# Patient Record
Sex: MALE | Race: OTHER | Employment: FULL TIME | URBAN - METROPOLITAN AREA
[De-identification: names, ages, dates, MRNs, and addresses within clinical notes are randomized per-mention and may not be internally consistent; named-entity substitution may affect disease eponyms.]

---

## 2024-10-09 ENCOUNTER — HOSPITAL ENCOUNTER (EMERGENCY)
Age: 59
Discharge: HOME | End: 2024-10-09
Attending: EMERGENCY MEDICINE
Payer: COMMERCIAL

## 2024-10-09 VITALS
HEIGHT: 69 IN | WEIGHT: 174.16 LBS | OXYGEN SATURATION: 98 % | SYSTOLIC BLOOD PRESSURE: 161 MMHG | TEMPERATURE: 98.3 F | BODY MASS INDEX: 25.8 KG/M2 | HEART RATE: 89 BPM | RESPIRATION RATE: 16 BRPM | DIASTOLIC BLOOD PRESSURE: 101 MMHG

## 2024-10-09 DIAGNOSIS — K04.7 DENTAL INFECTION: Primary | ICD-10-CM

## 2024-10-09 PROCEDURE — 99283 EMERGENCY DEPT VISIT LOW MDM: CPT

## 2024-10-09 RX ORDER — CLINDAMYCIN HCL 300 MG
300 CAPSULE ORAL 3 TIMES DAILY
Qty: 21 CAPSULE | Refills: 0 | Status: SHIPPED | OUTPATIENT
Start: 2024-10-09 | End: 2024-10-16

## 2024-10-09 RX ORDER — DICLOFENAC SODIUM 75 MG/1
75 TABLET, DELAYED RELEASE ORAL 2 TIMES DAILY
Qty: 30 TABLET | Refills: 0 | Status: SHIPPED | OUTPATIENT
Start: 2024-10-09

## 2024-10-09 RX ORDER — CLINDAMYCIN HCL 300 MG
300 CAPSULE ORAL 3 TIMES DAILY
Qty: 21 CAPSULE | Refills: 0 | Status: SHIPPED | OUTPATIENT
Start: 2024-10-09 | End: 2024-10-09

## 2024-10-09 RX ORDER — DICLOFENAC SODIUM 75 MG/1
75 TABLET, DELAYED RELEASE ORAL 2 TIMES DAILY
Qty: 30 TABLET | Refills: 0 | Status: SHIPPED | OUTPATIENT
Start: 2024-10-09 | End: 2024-10-09

## 2024-10-09 ASSESSMENT — PAIN - FUNCTIONAL ASSESSMENT: PAIN_FUNCTIONAL_ASSESSMENT: 0-10

## 2024-10-09 ASSESSMENT — PAIN DESCRIPTION - ORIENTATION: ORIENTATION: LEFT;LOWER

## 2024-10-09 ASSESSMENT — PAIN SCALES - GENERAL: PAINLEVEL_OUTOF10: 2

## 2024-10-09 ASSESSMENT — PAIN DESCRIPTION - LOCATION: LOCATION: TEETH

## 2024-10-09 NOTE — ED TRIAGE NOTES
Patient had tooth pulled on 10/3 and the day following he started with swelling. Patient advises that he was only given pain killers no antibiotics. Patient advises possible subjective fever this morning. Patient denies any further complaints Advises history of HTN and took medication as prescribed this morning.

## 2024-10-09 NOTE — ED PROVIDER NOTES
Emergency Department Provider Note       PCP: No primary care provider on file.   Age: 58 y.o.   Sex: male     DISPOSITION Decision To Discharge 10/09/2024 04:14:35 PM  Condition at Disposition: Data Unavailable       ICD-10-CM    1. Dental infection  K04.7           Medical Decision Making     Encourage patient to follow-up with his dentist.  Will give him clindamycin and Mobic for pain.  Patient happy with plan.     1 acute illness with systemic symptoms.  Prescription drug management performed.  Shared medical decision making was utilized in creating the patients health plan today.  I independently ordered and reviewed each unique test.                         History     Presents with swelling of his left lower jaw after dental extraction.  Patient states the swelling has been ongoing for 6 days.  He states he called his dentist and was told he did not need antibiotics and was given pain medication.  Patient reports the swelling has gotten slightly worse and he feels like he might have had a fever this morning.  He does not have a follow-up appointment with the dentist.  Patient is [place]-speaking only was seen with the .    The history is provided by the patient. A  was used.     Physical Exam     Vitals signs and nursing note reviewed:  Vitals:    10/09/24 1522   BP: (!) 161/101   Pulse: 89   Resp: 16   Temp: 98.3 °F (36.8 °C)   TempSrc: Oral   SpO2: 98%   Weight: 79 kg (174 lb 2.6 oz)   Height: 1.75 m (5' 8.9\")      Physical Exam  Vitals and nursing note reviewed.   Constitutional:       General: He is not in acute distress.     Appearance: Normal appearance. He is well-developed. He is not ill-appearing.   HENT:      Head: Normocephalic and atraumatic.      Mouth/Throat:      Mouth: Mucous membranes are moist.      Comments: Mild edema of the left lower jaw.  There is buccal swelling but no fluctuance.  There is no submandibular cellulitis edema or pain.  Pulmonary: